# Patient Record
Sex: MALE | Race: WHITE | NOT HISPANIC OR LATINO | Employment: FULL TIME | URBAN - METROPOLITAN AREA
[De-identification: names, ages, dates, MRNs, and addresses within clinical notes are randomized per-mention and may not be internally consistent; named-entity substitution may affect disease eponyms.]

---

## 2018-06-18 ENCOUNTER — OFFICE VISIT - HEALTHEAST (OUTPATIENT)
Dept: FAMILY MEDICINE | Facility: CLINIC | Age: 53
End: 2018-06-18

## 2018-06-18 DIAGNOSIS — Z12.11 SCREEN FOR COLON CANCER: ICD-10-CM

## 2018-06-18 DIAGNOSIS — Z71.1 CONCERN ABOUT HERNIA WITHOUT DIAGNOSIS: ICD-10-CM

## 2018-06-20 ENCOUNTER — COMMUNICATION - HEALTHEAST (OUTPATIENT)
Dept: FAMILY MEDICINE | Facility: CLINIC | Age: 53
End: 2018-06-20

## 2018-06-20 ENCOUNTER — COMMUNICATION - HEALTHEAST (OUTPATIENT)
Dept: SURGERY | Facility: CLINIC | Age: 53
End: 2018-06-20

## 2018-07-06 ENCOUNTER — OFFICE VISIT - HEALTHEAST (OUTPATIENT)
Dept: SURGERY | Facility: CLINIC | Age: 53
End: 2018-07-06

## 2018-07-06 DIAGNOSIS — K40.90 RIGHT INGUINAL HERNIA: ICD-10-CM

## 2018-07-06 ASSESSMENT — MIFFLIN-ST. JEOR: SCORE: 1547.63

## 2018-07-30 ENCOUNTER — OFFICE VISIT - HEALTHEAST (OUTPATIENT)
Dept: FAMILY MEDICINE | Facility: CLINIC | Age: 53
End: 2018-07-30

## 2018-07-30 DIAGNOSIS — K40.90 RIGHT INGUINAL HERNIA: ICD-10-CM

## 2018-07-30 DIAGNOSIS — R94.31 NONSPECIFIC ABNORMAL ELECTROCARDIOGRAM (ECG) (EKG): ICD-10-CM

## 2018-07-30 DIAGNOSIS — Z01.818 PRE-OP EXAM: ICD-10-CM

## 2018-07-30 LAB
ATRIAL RATE - MUSE: 59 BPM
DIASTOLIC BLOOD PRESSURE - MUSE: NORMAL MMHG
INTERPRETATION ECG - MUSE: NORMAL
P AXIS - MUSE: 73 DEGREES
PR INTERVAL - MUSE: 184 MS
QRS DURATION - MUSE: 90 MS
QT - MUSE: 402 MS
QTC - MUSE: 397 MS
R AXIS - MUSE: 10 DEGREES
SYSTOLIC BLOOD PRESSURE - MUSE: NORMAL MMHG
T AXIS - MUSE: 40 DEGREES
VENTRICULAR RATE- MUSE: 59 BPM

## 2018-07-30 ASSESSMENT — MIFFLIN-ST. JEOR: SCORE: 1550.46

## 2018-07-30 NOTE — ASSESSMENT & PLAN NOTE
Abnormal EKG.  No history of heart disease.  Indeed, this patient is quite physically active on a regular basis.  -We will evaluate cardiac function with a stress echocardiogram.  If normal, no contraindication to surgery.  We will attempt to expedite this testing.  -No indication for laboratory tests.

## 2018-08-08 ENCOUNTER — HOSPITAL ENCOUNTER (OUTPATIENT)
Dept: CARDIOLOGY | Facility: HOSPITAL | Age: 53
Discharge: HOME | End: 2018-08-08
Attending: FAMILY MEDICINE

## 2018-08-08 ENCOUNTER — COMMUNICATION - HEALTHEAST (OUTPATIENT)
Dept: FAMILY MEDICINE | Facility: CLINIC | Age: 53
End: 2018-08-08

## 2018-08-08 DIAGNOSIS — Z01.818 PRE-OP EXAM: ICD-10-CM

## 2018-08-08 DIAGNOSIS — R94.31 NONSPECIFIC ABNORMAL ELECTROCARDIOGRAM (ECG) (EKG): ICD-10-CM

## 2018-08-08 LAB
CV STRESS CURRENT BP HE: NORMAL
CV STRESS CURRENT HR HE: 103
CV STRESS CURRENT HR HE: 105
CV STRESS CURRENT HR HE: 105
CV STRESS CURRENT HR HE: 106
CV STRESS CURRENT HR HE: 120
CV STRESS CURRENT HR HE: 125
CV STRESS CURRENT HR HE: 125
CV STRESS CURRENT HR HE: 127
CV STRESS CURRENT HR HE: 143
CV STRESS CURRENT HR HE: 143
CV STRESS CURRENT HR HE: 147
CV STRESS CURRENT HR HE: 156
CV STRESS CURRENT HR HE: 158
CV STRESS CURRENT HR HE: 64
CV STRESS CURRENT HR HE: 66
CV STRESS CURRENT HR HE: 70
CV STRESS CURRENT HR HE: 70
CV STRESS CURRENT HR HE: 71
CV STRESS CURRENT HR HE: 72
CV STRESS CURRENT HR HE: 75
CV STRESS CURRENT HR HE: 75
CV STRESS CURRENT HR HE: 77
CV STRESS CURRENT HR HE: 80
CV STRESS CURRENT HR HE: 81
CV STRESS CURRENT HR HE: 83
CV STRESS CURRENT HR HE: 85
CV STRESS CURRENT HR HE: 85
CV STRESS CURRENT HR HE: 89
CV STRESS CURRENT HR HE: 90
CV STRESS CURRENT HR HE: 92
CV STRESS CURRENT HR HE: 92
CV STRESS CURRENT HR HE: 93
CV STRESS CURRENT HR HE: 93
CV STRESS CURRENT HR HE: 94
CV STRESS CURRENT HR HE: NORMAL
CV STRESS DEVIATION TIME HE: NORMAL
CV STRESS ECHO PERCENT HR HE: NORMAL
CV STRESS EXERCISE STAGE HE: NORMAL
CV STRESS FINAL RESTING BP HE: NORMAL
CV STRESS MAX HR HE: 159
CV STRESS MAX TREADMILL GRADE HE: 18
CV STRESS MAX TREADMILL SPEED HE: 5
CV STRESS PEAK DIA BP HE: NORMAL
CV STRESS PEAK SYS BP HE: NORMAL
CV STRESS PHASE HE: NORMAL
CV STRESS PROTOCOL HE: NORMAL
CV STRESS RESTING PT POSITION HE: NORMAL
CV STRESS ST DEVIATION AMOUNT HE: NORMAL
CV STRESS ST DEVIATION ELEVATION HE: NORMAL
CV STRESS ST EVELATION AMOUNT HE: NORMAL
CV STRESS TEST TYPE HE: NORMAL
CV STRESS TOTAL STAGE TIME MIN 1 HE: NORMAL
ECHO EJECTION FRACTION ESTIMATED: 55 %
STRESS ECHO BASELINE BP: NORMAL
STRESS ECHO BASELINE HR: 69
STRESS ECHO CALCULATED PERCENT HR: 95 %
STRESS ECHO LAST STRESS BP: NORMAL
STRESS ECHO LAST STRESS HR: 158
STRESS ECHO POST ESTIMATED WORKLOAD: 14.9
STRESS ECHO POST EXERCISE DUR MIN: 14
STRESS ECHO POST EXERCISE DUR SEC: 59
STRESS ECHO TARGET HR: 143

## 2018-08-09 ENCOUNTER — ANESTHESIA - HEALTHEAST (OUTPATIENT)
Dept: SURGERY | Facility: HOSPITAL | Age: 53
End: 2018-08-09

## 2018-08-09 ENCOUNTER — COMMUNICATION - HEALTHEAST (OUTPATIENT)
Dept: FAMILY MEDICINE | Facility: CLINIC | Age: 53
End: 2018-08-09

## 2018-08-09 ASSESSMENT — MIFFLIN-ST. JEOR: SCORE: 1550.46

## 2018-08-10 ENCOUNTER — SURGERY - HEALTHEAST (OUTPATIENT)
Dept: SURGERY | Facility: HOSPITAL | Age: 53
End: 2018-08-10

## 2018-08-23 ENCOUNTER — OFFICE VISIT - HEALTHEAST (OUTPATIENT)
Dept: SURGERY | Facility: CLINIC | Age: 53
End: 2018-08-23

## 2018-08-23 DIAGNOSIS — Z98.890 POST-OPERATIVE STATE: ICD-10-CM

## 2018-10-15 ENCOUNTER — RECORDS - HEALTHEAST (OUTPATIENT)
Dept: ADMINISTRATIVE | Facility: OTHER | Age: 53
End: 2018-10-15

## 2021-04-08 ENCOUNTER — AMBULATORY - HEALTHEAST (OUTPATIENT)
Dept: NURSING | Facility: CLINIC | Age: 56
End: 2021-04-08

## 2021-04-29 ENCOUNTER — AMBULATORY - HEALTHEAST (OUTPATIENT)
Dept: NURSING | Facility: CLINIC | Age: 56
End: 2021-04-29

## 2021-06-01 VITALS — BODY MASS INDEX: 24.63 KG/M2 | WEIGHT: 162.5 LBS | HEIGHT: 68 IN

## 2021-06-01 VITALS — HEIGHT: 68 IN | BODY MASS INDEX: 24.86 KG/M2 | WEIGHT: 164 LBS

## 2021-06-01 VITALS — WEIGHT: 167 LBS | BODY MASS INDEX: 25.77 KG/M2

## 2021-06-01 VITALS — HEIGHT: 68 IN | BODY MASS INDEX: 25.19 KG/M2 | WEIGHT: 166.23 LBS

## 2021-06-16 PROBLEM — R94.31 NONSPECIFIC ABNORMAL ELECTROCARDIOGRAM (ECG) (EKG): Status: ACTIVE | Noted: 2018-07-30

## 2021-06-16 PROBLEM — K40.90 RIGHT INGUINAL HERNIA: Status: ACTIVE | Noted: 2018-07-06

## 2021-06-18 NOTE — PROGRESS NOTES
Assessment/Plan:    Rodriguez was seen today for hernia.    Diagnoses and all orders for this visit:    Concern about hernia without diagnosis: Exam is concerning for hernia as well as symptoms.  Referral to general surgery, Dr. Griggs specifically as the surgeon who he has seen previously.  -     Ambulatory referral to General Surgery    Screen for colon cancer  -     Ambulatory referral for Colonoscopy     Return for if not improving, recheck follow-up visit.    Nito Meyer MD  _______________________________    Chief Complaint   Patient presents with     Hernia     right side of groin      Subjective: Rodriguez García is a 52 y.o. year old male who I have seen in clinic before who presents with the following acute complaint(s):    Right groin pain:   - history of left sided hernia   - worse with soccer.  Worse with movement.   - mild budge.  Not consistent.   - no testicular pain   - no other concerns.   - onset: ~1 month    - surgery was completed by Dr. Morgan in early 2016.      ROS: Complete review of systems obtained.  Pertinent items are listed above.     The following portions of the patient's history were reviewed and updated as appropriate: allergies, current medications, past medical history and problem list.     Objective:   /84 (Patient Site: Left Arm, Patient Position: Sitting, Cuff Size: Adult Regular)  Pulse 70  Wt 167 lb (75.8 kg)  BMI 25.77 kg/m2  General: No acute distress  Brain: There is a fullness to palpation in the right suprapubic area.  Contralateral side is relatively flat.  Nontender to palpation.    No results found for this or any previous visit (from the past 24 hour(s)).  No results found.    Additional History from Old Records Summarized (2): no  Decision to Obtain Records (1): no  Radiology Tests Summarized or Ordered (1): no  Labs Reviewed or Ordered (1): no  Medicine Test Summarized or Ordered (1): no  Independent Review of EKG or X-RAY(2 each): no    This  note has been dictated using voice recognition software. Any grammatical or context distortions are unintentional and inherent to the software

## 2021-06-19 NOTE — ANESTHESIA POSTPROCEDURE EVALUATION
Patient: Rodriguez García  REPAIR, HERNIA, INGUINAL, ROBOT-ASSISTED, LAPAROSCOPIC  Anesthesia type: general    Patient location: Phase II Recovery  Last vitals:   Vitals:    08/10/18 1048   BP: 119/74   Pulse:    Resp: 18   Temp:    SpO2:      Post vital signs: stable  Level of consciousness: awake and responds to simple questions  Post-anesthesia pain: pain controlled  Post-anesthesia nausea and vomiting: no  Pulmonary: unassisted, return to baseline  Cardiovascular: stable and blood pressure at baseline  Hydration: adequate  Anesthetic events: no    QCDR Measures:  ASA# 11 - Arely-op Cardiac Arrest: ASA11B - Patient did NOT experience unanticipated cardiac arrest  ASA# 12 - Arely-op Mortality Rate: ASA12B - Patient did NOT die  ASA# 13 - PACU Re-Intubation Rate: ASA13B - Patient did NOT require a new airway mgmt  ASA# 10 - Composite Anes Safety: ASA10A - No serious adverse event    Additional Notes:

## 2021-06-19 NOTE — PROGRESS NOTES
"HPI:  This is a 53 y.o. male here today with concerns of pain and bulging in his right groin area. He has noted this for the past few week(s). The symptoms have progressed and increased over this time. He comes in for evaluation secondary to the hernia causing enough issues to bother them with daily activities or chores.    Allergies:Review of patient's allergies indicates no known allergies.    History reviewed. No pertinent past medical history.    Past Surgical History:   Procedure Laterality Date     INGUINAL HERNIA REPAIR Left 03/04/2016    Dr. Griggs     KNEE ARTHROSCOPY W/ ACL RECONSTRUCTION Right 5/13/2015    Procedure: RIGHT ARTHROSCOPIC ANTERIOR CRUCIATE LIGAMENT RECONSTRUCTION, HAMSTRING AUTOGRAFT, MEDIAL MENISCECTOMY;  Surgeon: Milo Ford MD;  Location: [facility];  Service:      WISDOM TOOTH EXTRACTION         CURRENT MEDS:      Family History   Problem Relation Age of Onset     No Medical Problems Mother      Colon cancer Father      No Medical Problems Sister      No Medical Problems Brother      No Medical Problems Sister      No Medical Problems Brother      No Medical Problems Brother      No Medical Problems Brother      No Medical Problems Brother      No Medical Problems Brother         reports that he has never smoked. He has never used smokeless tobacco. He reports that he drinks about 1.2 oz of alcohol per week  He reports that he does not use illicit drugs.    Review of Systems - Negative except right groin pain. Left lap repair in the past with great results.     Vitals:    07/06/18 1300   BP: 129/84   Patient Site: Right Arm   Patient Position: Sitting   Cuff Size: Adult Regular   Pulse: 64   SpO2: 98%   Weight: 162 lb 8 oz (73.7 kg)   Height: 5' 7.75\" (1.721 m)       Body mass index is 24.89 kg/(m^2).    EXAM:  General: NAD   HEENT: normocephalic, PERRLA and EOMS intact  Mounth: Mucus membranes moist  Neck: Supple  Chest: Clear to auscultation bilaterally  CV: RRR  ABD: " Soft nontender and nondistended, right inguinal hernia, reducible  EXT: Warm, pulses intact,   Neuro: Alert and oriented x3  Back: no CVA tenderness      Assessment/Plan: Pt with a right inguinal hernia. I discussed this at length with He.  I went over conservative management as well as surgical treatment of this.. I would plan on doing this via anrobotic  approach with possible use of mesh. I went over the small risks of surgery including but not limited to bleeding and infection, anesthesia, recurrence rates and nerve injury. I discussed the expected recovery time as well. Will get this scheduled. He will contact us to have this scheduled.      Oswaldo Griggs MD  [facility] Department of Surgery

## 2021-06-19 NOTE — ANESTHESIA PREPROCEDURE EVALUATION
Anesthesia Evaluation      Patient summary reviewed   History of anesthetic complications     Airway   Mallampati: II  Neck ROM: full   Pulmonary - negative ROS and normal exam    breath sounds clear to auscultation  (-) sleep apnea, not a smoker                         Cardiovascular - negative ROS  Exercise tolerance: > or = 10 METS  (-) angina, murmur  Rhythm: regular  Rate: normal,    no murmur      Neuro/Psych - negative ROS     Endo/Other - negative ROS      GI/Hepatic/Renal - negative ROS           Dental - normal exam                        Anesthesia Plan  Planned anesthetic: general endotracheal and total IV anesthesia  zofran  10 mg decadron  scopolamine  ASA 1   Induction: intravenous   Anesthetic plan and risks discussed with: patient and spouse  Anesthesia plan special considerations: antiemetics,   Post-op plan: routine recovery

## 2021-06-19 NOTE — ANESTHESIA CARE TRANSFER NOTE
Last vitals:   Vitals:    08/10/18 0940   BP: 134/71   Pulse: 66   Resp: 20   Temp: 36  C (96.8  F)   SpO2: 97%     Pt brought to PACU on 10L facemask. Monitors applied. VSS upon arrival.    Patient's level of consciousness is drowsy  Spontaneous respirations: yes  Maintains airway independently: yes  Dentition unchanged: yes  Oropharynx: oropharynx clear of all foreign objects    QCDR Measures:  ASA# 20 - Surgical Safety Checklist: WHO surgical safety checklist completed prior to induction  PQRS# 430 - Adult PONV Prevention: 4558F - Pt received => 2 anti-emetic agents (different classes) preop & intraop  ASA# 8 - Peds PONV Prevention: NA - Not pediatric patient, not GA or 2 or more risk factors NOT present  PQRS# 424 - Arely-op Temp Management: 4559F - At least one body temp DOCUMENTED => 35.5C or 95.9F within required timeframe  PQRS# 426 - PACU Transfer Protocol: - Transfer of care checklist used  ASA# 14 - Acute Post-op Pain: ASA14B - Patient did NOT experience pain >= 7 out of 10

## 2021-06-19 NOTE — PROGRESS NOTES
I reviewed the patient's echocardiogram stress test performed on July 30 and the results came back negative for inducible ischemia.  The patient is therefore approved for surgical hernia repair tomorrow.    Dr. Howard (covering for Dr. Michaud)

## 2021-06-19 NOTE — PROGRESS NOTES
Preoperative Exam    Scheduled Procedure: hernia repair   Surgery Date: 08/10/2018  Surgery Location: [facility], fax [phone]    Surgeon:  Dr. Griggs     Assessment/Plan:     Problem List Items Addressed This Visit     Right inguinal hernia     Abnormal EKG.  No history of heart disease.  Indeed, this patient is quite physically active on a regular basis.  -We will evaluate cardiac function with a stress echocardiogram.  If normal, no contraindication to surgery.  We will attempt to expedite this testing.  -No indication for laboratory tests.         Nonspecific abnormal electrocardiogram (ECG) (EKG)    Relevant Orders    Echo Stress Exercise      Other Visit Diagnoses     Pre-op exam    -  Primary    Relevant Orders    Electrocardiogram Perform and Read (Completed)    Echo Stress Exercise        Surgical Procedure Risk: Low (reported cardiac risk generally < 1%)  Have you had prior anesthesia?: Yes  Have you or any family members had a previous anesthesia reaction:  Pt states he had dizziness after knee surgery   Do you or any family members have a history of a clotting or bleeding disorder?: No  Cardiac Risk Assessment:     Patient is not approved for surgery Pending additional cardiovascular evaluation.  Stress echocardiogram ordered.  Anticipate normal study.  We will try to get this test completed in expedited fashion in order for him to have the surgery on 8/10.     - history of post-operative nausea.  Consider intraoperative anti-emetics.     Functional Status: Independent  Patient plans to recover at home with family.     Subjective:      Rodriguez García is a 53 y.o. male who presents for a preoperative consultation.      All other systems reviewed and are negative, other than those listed in the HPI.    Pertinent History  Do you have difficulty breathing or chest pain after walking up a flight of stairs: No  History of obstructive sleep apnea: No  Steroid use in the last 6 months:  "No  Frequent Aspirin/NSAID use: No  Prior Blood Transfusion: No  Prior Blood Transfusion Reaction: No  If for some reason prior to, during or after the procedure, if it is medically indicated, would you be willing to have a blood transfusion?:  There is no transfusion refusal.    No current outpatient prescriptions on file.     No current facility-administered medications for this visit.         No Known Allergies    Patient Active Problem List   Diagnosis     Right inguinal hernia     Nonspecific abnormal electrocardiogram (ECG) (EKG)       History reviewed. No pertinent past medical history.    Past Surgical History:   Procedure Laterality Date     INGUINAL HERNIA REPAIR Left 03/04/2016    Dr. Griggs     KNEE ARTHROSCOPY W/ ACL RECONSTRUCTION Right 5/13/2015    Procedure: RIGHT ARTHROSCOPIC ANTERIOR CRUCIATE LIGAMENT RECONSTRUCTION, HAMSTRING AUTOGRAFT, MEDIAL MENISCECTOMY;  Surgeon: Milo Ford MD;  Location: [facility];  Service:      WISDOM TOOTH EXTRACTION         Social History     Social History     Marital status:      Spouse name: N/A     Number of children: N/A     Years of education: N/A     Occupational History     Not on file.     Social History Main Topics     Smoking status: Never Smoker     Smokeless tobacco: Never Used     Alcohol use 1.2 oz/week     2 Cans of beer per week     Drug use: No     Sexual activity: Not on file     Other Topics Concern     Not on file     Social History Narrative     Patient Care Team:  Nito Meyer MD as PCP - General (Family Medicine)    Objective:     Vitals:    07/30/18 1547   BP: 120/64   Pulse: 60   Resp: 18   Temp: 98.1  F (36.7  C)   TempSrc: Oral   SpO2: 98%   Weight: 164 lb (74.4 kg)   Height: 5' 7.5\" (1.715 m)     Physical Exam:  Physical Exam   Constitutional: He is oriented to person, place, and time. He appears well-developed. No distress.   HENT:   Head: Normocephalic and atraumatic.   Right Ear: External ear normal.   Left " Ear: External ear normal.   Nose: Nose normal.   Mouth/Throat: Oropharynx is clear and moist. No oropharyngeal exudate.   Eyes: Conjunctivae and EOM are normal. Pupils are equal, round, and reactive to light. Right eye exhibits no discharge. Left eye exhibits no discharge. No scleral icterus.   Neck: Normal range of motion. No thyromegaly present.   Cardiovascular: Normal rate, regular rhythm, normal heart sounds and intact distal pulses.  Exam reveals no gallop and no friction rub.    No murmur heard.  Pulmonary/Chest: Effort normal and breath sounds normal. No respiratory distress. He has no wheezes.   Abdominal: Soft. He exhibits no distension and no mass. There is no tenderness.   Musculoskeletal: Normal range of motion. He exhibits no edema.   Lymphadenopathy:     He has no cervical adenopathy.   Neurological: He is alert and oriented to person, place, and time. He has normal reflexes. No cranial nerve deficit. He exhibits normal muscle tone.   Skin: Skin is warm.   Psychiatric: He has a normal mood and affect. His behavior is normal. Judgment and thought content normal.   Vitals reviewed.      There are no Patient Instructions on file for this visit.    EKG: Q waves in the inferior distribution.  Possible age-indeterminate inferior infarct.  No comparisons are available.  My interpretation.    Labs:  Recent Results (from the past 48 hour(s))   Electrocardiogram Perform and Read    Collection Time: 07/30/18  3:58 PM   Result Value Ref Range    SYSTOLIC BLOOD PRESSURE  mmHg    DIASTOLIC BLOOD PRESSURE  mmHg    VENTRICULAR RATE 59 BPM    ATRIAL RATE 59 BPM    P-R INTERVAL 184 ms    QRS DURATION 90 ms    Q-T INTERVAL 402 ms    QTC CALCULATION (BEZET) 397 ms    P Axis 73 degrees    R AXIS 10 degrees    T AXIS 40 degrees    MUSE DIAGNOSIS       Sinus bradycardia  Possible Left atrial enlargement  Cannot rule out Inferior infarct , age undetermined  Abnormal ECG  No previous ECGs available  Confirmed by KEELY ROMAN,  TAL LOC:SJ ([id number]) on 7/30/2018 4:11:10 PM          There is no immunization history on file for this patient.        Electronically signed by Nito Meyer MD 07/31/18 3:50 PM

## 2021-06-20 NOTE — PROGRESS NOTES
HPI: Pt is here for follow up of a robotic inguinal hernia repair.   he is doing well.  Pain is well controlled.  No difficulties with the surgical wound/wounds.  he is eating well and denies fever and chills.         /78 (Patient Site: Right Arm, Patient Position: Sitting, Cuff Size: Adult Regular)  Pulse 60  SpO2 98%    EXAM:  GENERAL:Appears well  ABDOMEN:  Soft, +BS  SURGICAL WOUNDS:  Incisions healing well, no enduration or drainage.      Assessment/Plan: . Doing well after surgery and should follow up as needed.      Milo Schwartz, [facility], [facility] Department of Surgery

## 2021-06-26 ENCOUNTER — HEALTH MAINTENANCE LETTER (OUTPATIENT)
Age: 56
End: 2021-06-26

## 2021-10-16 ENCOUNTER — HEALTH MAINTENANCE LETTER (OUTPATIENT)
Age: 56
End: 2021-10-16

## 2022-07-23 ENCOUNTER — HEALTH MAINTENANCE LETTER (OUTPATIENT)
Age: 57
End: 2022-07-23

## 2022-10-01 ENCOUNTER — HEALTH MAINTENANCE LETTER (OUTPATIENT)
Age: 57
End: 2022-10-01

## 2023-08-01 ENCOUNTER — OFFICE VISIT (OUTPATIENT)
Dept: FAMILY MEDICINE | Facility: CLINIC | Age: 58
End: 2023-08-01
Payer: COMMERCIAL

## 2023-08-01 VITALS
TEMPERATURE: 97.9 F | BODY MASS INDEX: 25.58 KG/M2 | OXYGEN SATURATION: 99 % | HEART RATE: 71 BPM | SYSTOLIC BLOOD PRESSURE: 127 MMHG | RESPIRATION RATE: 16 BRPM | HEIGHT: 67 IN | DIASTOLIC BLOOD PRESSURE: 81 MMHG | WEIGHT: 163 LBS

## 2023-08-01 DIAGNOSIS — Z12.11 SPECIAL SCREENING FOR MALIGNANT NEOPLASMS, COLON: ICD-10-CM

## 2023-08-01 DIAGNOSIS — M25.50 ARTHRALGIA, UNSPECIFIED JOINT: Primary | ICD-10-CM

## 2023-08-01 DIAGNOSIS — Z80.0 FAMILY HISTORY OF COLON CANCER: ICD-10-CM

## 2023-08-01 LAB
CRP SERPL-MCNC: <3 MG/L
ERYTHROCYTE [SEDIMENTATION RATE] IN BLOOD BY WESTERGREN METHOD: 4 MM/HR (ref 0–20)

## 2023-08-01 PROCEDURE — 90471 IMMUNIZATION ADMIN: CPT | Performed by: FAMILY MEDICINE

## 2023-08-01 PROCEDURE — 85652 RBC SED RATE AUTOMATED: CPT | Performed by: FAMILY MEDICINE

## 2023-08-01 PROCEDURE — 86618 LYME DISEASE ANTIBODY: CPT | Performed by: FAMILY MEDICINE

## 2023-08-01 PROCEDURE — 90715 TDAP VACCINE 7 YRS/> IM: CPT | Performed by: FAMILY MEDICINE

## 2023-08-01 PROCEDURE — 99213 OFFICE O/P EST LOW 20 MIN: CPT | Mod: 25 | Performed by: FAMILY MEDICINE

## 2023-08-01 PROCEDURE — 86140 C-REACTIVE PROTEIN: CPT | Performed by: FAMILY MEDICINE

## 2023-08-01 PROCEDURE — 36415 COLL VENOUS BLD VENIPUNCTURE: CPT | Performed by: FAMILY MEDICINE

## 2023-08-01 ASSESSMENT — ENCOUNTER SYMPTOMS: CONSTITUTIONAL NEGATIVE: 1

## 2023-08-01 NOTE — ASSESSMENT & PLAN NOTE
Body pains, localizing to his upper back and neck.  Periodic.  Patient lives in an area endemic for Lyme disease and wonders if this might be systemic Lyme.  We will check Lyme titers and markers of inflammation.  If positive, plan to treat with 21 days of doxycycline.

## 2023-08-01 NOTE — PROGRESS NOTES
"  Assessment & Plan   Problem List Items Addressed This Visit       Arthralgia, unspecified joint - Primary     Body pains, localizing to his upper back and neck.  Periodic.  Patient [place] in an area endemic for Lyme disease and wonders if this might be systemic Lyme.  We will check Lyme titers and markers of inflammation.  If positive, plan to treat with 21 days of doxycycline.         Relevant Orders    Lyme Disease Total Abs Bld with Reflex to Confirm CLIA    CRP, inflammation    ESR: Erythrocyte sedimentation rate     Other Visit Diagnoses       Special screening for malignant neoplasms, colon        Relevant Orders    Colonoscopy Screening  Referral    Family history of colon cancer        Relevant Orders    Colonoscopy Screening  Referral           Nito Meyer MD  [facility]    Radha Frias is a 58 year old, presenting for the following health issues:  Generalized Body Aches (Sore and sleepy. Sx started in May. Neck stiffness.)        8/1/2023     7:20 AM   Additional Questions   Roomed by sac   Accompanied by self         8/1/2023     7:20 AM   Patient Reported Additional Medications   Patient reports taking the following new medications no       Fatigue:   - stiff neck   - late may symptoms of feeling poor energy.  Comes and goes.  \"Every few weeks.\"     - continues to exercise.  Achy in a way that is unexpected.      History of Present Illness       Reason for visit:  Lymes Syptoms  Symptom onset:  More than a month    He eats 4 or more servings of fruits and vegetables daily.He consumes 0 sweetened beverage(s) daily.He exercises with enough effort to increase his heart rate 30 to 60 minutes per day.  He exercises with enough effort to increase his heart rate 6 days per week.   He is taking medications regularly.    Review of Systems   Constitutional: Negative.    All other systems reviewed and are negative.         Objective    /81 (BP " "Location: Left arm, Patient Position: Sitting, Cuff Size: Adult Large)   Pulse 71   Temp 97.9  F (36.6  C) (Oral)   Resp 16   Ht 1.702 m (5' 7\")   Wt 73.9 kg (163 lb)   SpO2 99%   BMI 25.53 kg/m    Body mass index is 25.53 kg/m .  Physical Exam  Nursing note reviewed.   Constitutional:       General: He is not in acute distress.     Appearance: Normal appearance. He is not ill-appearing.   HENT:      Head: Normocephalic and atraumatic.      Right Ear: External ear normal.      Left Ear: External ear normal.   Eyes:      General: No scleral icterus.     Extraocular Movements: Extraocular movements intact.      Conjunctiva/sclera: Conjunctivae normal.   Cardiovascular:      Rate and Rhythm: Normal rate and regular rhythm.      Heart sounds: Normal heart sounds. No murmur heard.     No friction rub. No gallop.   Pulmonary:      Effort: Pulmonary effort is normal. No respiratory distress.      Breath sounds: Normal breath sounds. No wheezing or rales.   Musculoskeletal:         General: No swelling. Normal range of motion.   Skin:     General: Skin is warm.      Coloration: Skin is not jaundiced.      Findings: No rash.   Neurological:      General: No focal deficit present.      Mental Status: He is alert and oriented to person, place, and time. Mental status is at baseline.   Psychiatric:         Attention and Perception: Attention normal.         Mood and Affect: Mood normal.         Speech: Speech normal.         Thought Content: Thought content normal.                          "

## 2023-08-02 LAB — B BURGDOR IGG+IGM SER QL: 0.09

## 2023-08-06 ENCOUNTER — HEALTH MAINTENANCE LETTER (OUTPATIENT)
Age: 58
End: 2023-08-06

## 2023-09-03 ENCOUNTER — HOSPITAL ENCOUNTER (EMERGENCY)
Facility: CLINIC | Age: 58
Discharge: HOME | End: 2023-09-03
Attending: PHYSICIAN ASSISTANT | Admitting: PHYSICIAN ASSISTANT
Payer: COMMERCIAL

## 2023-09-03 VITALS
DIASTOLIC BLOOD PRESSURE: 80 MMHG | OXYGEN SATURATION: 97 % | RESPIRATION RATE: 16 BRPM | TEMPERATURE: 97.8 F | HEART RATE: 82 BPM | SYSTOLIC BLOOD PRESSURE: 125 MMHG

## 2023-09-03 DIAGNOSIS — S91.311A LACERATION OF RIGHT FOOT, INITIAL ENCOUNTER: ICD-10-CM

## 2023-09-03 PROCEDURE — 12002 RPR S/N/AX/GEN/TRNK2.6-7.5CM: CPT

## 2023-09-03 PROCEDURE — 99213 OFFICE O/P EST LOW 20 MIN: CPT | Mod: 25 | Performed by: PHYSICIAN ASSISTANT

## 2023-09-03 PROCEDURE — 12002 RPR S/N/AX/GEN/TRNK2.6-7.5CM: CPT | Performed by: PHYSICIAN ASSISTANT

## 2023-09-03 PROCEDURE — G0463 HOSPITAL OUTPT CLINIC VISIT: HCPCS

## 2023-09-03 ASSESSMENT — ENCOUNTER SYMPTOMS
NEUROLOGICAL NEGATIVE: 1
WOUND: 1
CONSTITUTIONAL NEGATIVE: 1

## 2023-09-03 ASSESSMENT — ACTIVITIES OF DAILY LIVING (ADL): ADLS_ACUITY_SCORE: 35

## 2023-09-03 NOTE — ED PROVIDER NOTES
History     Chief Complaint   Patient presents with    Laceration     Right bottom of foot and one on the big toe. [place]      HPI  Rodriguez García is a 58 year old male who is for laceration on the bottom of the right foot, beneath the MCP joint as well as the bottom of the right great toe which occurred earlier this afternoon.  Stepped on glass bottle [place], causing the lacerations.  Bleeding is controlled direct pressure.  The patient is not on blood thinners.  Did not clean the wound prior to arrival.  No medications taken for relief of pain prior to arrival.  Last Tdap vaccine was in August 2023.  No deficits in strength or sensation in the right foot or right lower extremity.    Allergies:  No Known Allergies    Problem List:    Patient Active Problem List    Diagnosis Date Noted    Arthralgia, unspecified joint 08/01/2023     Priority: Medium    Nonspecific abnormal electrocardiogram (ECG) (EKG) 07/30/2018     Priority: Medium    Right inguinal hernia 07/06/2018     Priority: Medium     Added automatically from request for surgery [id number]            Past Medical History:    No past medical history on file.    Past Surgical History:    Past Surgical History:   Procedure Laterality Date    ARTHROSCOPIC RECONSTRUCTION ANTERIOR CRUCIATE LIGAMENT Right 5/13/2015    Procedure: RIGHT ARTHROSCOPIC ANTERIOR CRUCIATE LIGAMENT RECONSTRUCTION, HAMSTRING AUTOGRAFT, MEDIAL MENISCECTOMY;  Surgeon: Milo Ford MD;  Location: [facility];  Service:     INGUINAL HERNIA REPAIR Left 03/04/2016    Dr. Griggs    RI LAP,INGUINAL HERNIA REPR,INITIAL Right 8/10/2018    Procedure: REPAIR, HERNIA, INGUINAL, ROBOT-ASSISTED, LAPAROSCOPIC;  Surgeon: Oswaldo Griggs MD;  Location: [facility] OR;  Service: General    WISDOM TOOTH EXTRACTION         Family History:    Family History   Problem Relation Age of Onset    No Known Problems Mother     Colon  Cancer Father     No Known Problems Sister     No Known Problems Brother     No Known Problems Sister     No Known Problems Brother     No Known Problems Brother     No Known Problems Brother     No Known Problems Brother     No Known Problems Brother     Prostate Cancer No family hx of        Social History:  Marital Status:   [2]  Social History     Tobacco Use    Smoking status: Never     Passive exposure: Never    Smokeless tobacco: Never   Vaping Use    Vaping Use: Never used   Substance Use Topics    Alcohol use: Yes     Alcohol/week: 2.0 standard drinks of alcohol    Drug use: No        Medications:    No current outpatient medications on file.        Review of Systems   Constitutional: Negative.    Skin:  Positive for wound.   Neurological: Negative.        Physical Exam   BP: 125/80  Pulse: 82  Temp: 97.8  F (36.6  C)  Resp: 16  SpO2: 97 %      Physical Exam  Constitutional:       General: He is not in acute distress.     Appearance: Normal appearance. He is not ill-appearing, toxic-appearing or diaphoretic.   Cardiovascular:      Pulses: Normal pulses.           Dorsalis pedis pulses are 2+ on the right side.        Posterior tibial pulses are 2+ on the right side.   Skin:     General: Skin is warm and dry.      Findings: Laceration present. No rash.      Comments: Two lacerations on the plantar surface of the right foot.  Laceration is 3 cm in length, on the plantar surface of the first MTP joint, 2 mm in depth.  The second laceration is on the plantar surface of the right great toe, oblique to the surface of the skin, approximately 3 cm in length and 1 mm in depth.  Normal strength and sensation in the right foot and right foot toes.  No foreign bodies present on exam.   Neurological:      Mental Status: He is alert and oriented to person, place, and time.      Sensory: No sensory deficit.         ED [facility]    -Laceration Repair    Date/Time:  9/6/2023 11:06 PM    Performed by: Adin Albright PA-C  Authorized by: Adin Albright PA-C    Risks, benefits and alternatives discussed.      ANESTHESIA (see MAR for exact dosages):     Anesthesia method:  Local infiltration    Local anesthetic:  Lidocaine 1% w/o epi  LACERATION DETAILS     Location:  Foot    Foot location:  Sole of R foot    Length (cm):  3    Depth (mm):  2    REPAIR TYPE:     Repair type:  Simple    EXPLORATION:     Hemostasis achieved with:  Direct pressure    Wound exploration: wound explored through full range of motion and entire depth of wound probed and visualized      Wound extent: areolar tissue not violated, fascia not violated, no foreign body, no signs of injury, no nerve damage, no tendon damage, no underlying fracture and no vascular damage      Contaminated: no      TREATMENT:     Area cleansed with:  Betadine    Amount of cleaning:  Standard    Irrigation solution:  Sterile saline    Irrigation volume:  100    Irrigation method:  Pressure wash and syringe    Visualized foreign bodies/material removed: no      SKIN REPAIR     Repair method:  Sutures    Suture size:  4-0    Suture material:  Nylon    Suture technique:  Simple interrupted    Number of sutures:  4    APPROXIMATION     Approximation:  Close    POST-PROCEDURE DETAILS     Dressing:  Antibiotic ointment and non-adherent dressing      PROCEDURE    Patient Tolerance:  Patient tolerated the procedure well with no immediate complications  [facility]    -Laceration Repair    Date/Time: 9/6/2023 11:07 PM    Performed by: Adin Albright PA-C  Authorized by: Adin Albright PA-C    Risks, benefits and alternatives discussed.      ANESTHESIA (see MAR for exact dosages):     Anesthesia method:  Local infiltration    Local anesthetic:  Lidocaine 1% w/o epi  LACERATION DETAILS     Location:  Toe    Toe location:  R big toe    Length (cm):  3    Depth (mm):  1    REPAIR TYPE:      Repair type:  Simple    EXPLORATION:     Hemostasis achieved with:  Direct pressure    Wound exploration: wound explored through full range of motion and entire depth of wound probed and visualized      Wound extent: areolar tissue not violated, fascia not violated, no foreign body, no signs of injury, no nerve damage, no tendon damage, no underlying fracture and no vascular damage      Contaminated: no      TREATMENT:     Area cleansed with:  Betadine    Amount of cleaning:  Standard    Irrigation solution:  Sterile saline    Irrigation volume:  100    Irrigation method:  Pressure wash and syringe    Visualized foreign bodies/material removed: no      SKIN REPAIR     Repair method:  Sutures    Suture size:  4-0    Suture material:  Nylon    Suture technique:  Simple interrupted    Number of sutures:  3    APPROXIMATION     Approximation:  Close    POST-PROCEDURE DETAILS     Dressing:  Antibiotic ointment and non-adherent dressing      PROCEDURE    Patient Tolerance:  Patient tolerated the procedure well with no immediate complications              No results found for this or any previous visit (from the past 24 hour(s)).    Medications - No data to display    Assessments & Plan (with Medical Decision Making)     The patient is a 58-year-old male who presents for evaluation of lacerations on the plantar surface of the right foot as described above.  No neuromuscular deficits on exam.  Bleeding was controlled with direct pressure and with sutures.  No foreign bodies present on exam.    Laceration repair was performed as above.  Apply ice for 15-minute intervals over the next 48 hours and use over-the-counter Tylenol/ibuprofen for pain control as needed.  Recommended keeping the wound covered and dry for the next 24 hours.  Then allow exposure to air at nighttime, change dressings and apply bacitracin twice daily for the next 7 days. Recommend keeping the wound covered while while there is potential for  contamination.  Recommend returning to clinic in 7 to 10 days for suture removal and wound recheck.    Recommend urgent medical evaluation if you develop fevers, worsening pain, pain out of proportion to injury, numbness or tingling or loss of feeling, worsening redness/tenderness/swelling right foot or red streaks progressing up into the right lower extremity.    I have reviewed the nursing notes.    I have reviewed the findings, diagnosis, plan and need for follow up with the patient.      There are no discharge medications for this patient.      Final diagnoses:   Laceration of right foot, initial encounter       9/3/2023   [facility] EMERGENCY DEPT       Adin Albright PA-C  09/06/23 [id number]

## 2023-09-03 NOTE — DISCHARGE INSTRUCTIONS
pply ice for 15-minute intervals over the next 48 hours and use over-the-counter Tylenol/ibuprofen for pain control as needed.  Recommended keeping the wound covered and dry for the next 24 hours.  Then allow exposure to air at nighttime, change dressings and apply bacitracin twice daily for the next 7 days. Recommend keeping the wound covered while while there is potential for contamination.  Recommend returning to clinic in 7 to 10 days for suture removal and wound recheck.    Recommend urgent medical evaluation if you develop fevers, worsening pain, pain out of proportion to injury, numbness or tingling or loss of feeling, worsening redness/tenderness/swelling right foot or red streaks progressing up into the right lower extremity.

## 2023-09-05 ENCOUNTER — TELEPHONE (OUTPATIENT)
Dept: FAMILY MEDICINE | Facility: CLINIC | Age: 58
End: 2023-09-05
Payer: COMMERCIAL

## 2023-09-05 NOTE — TELEPHONE ENCOUNTER
----- Message from Nito Myeer MD sent at 9/5/2023  5:54 AM CDT -----  Regarding: Needs follow-up for suture removal  Please call and help schedule with any provider for suture removal.  Per ED notes 9/10 is the approximate date for removal of sutures.

## 2023-09-06 NOTE — TELEPHONE ENCOUNTER
Left message to call back for: Patient  Information to relay to patient: Please help patient schedule RN for suture removal, 9/11/2023.

## 2023-09-15 ENCOUNTER — ALLIED HEALTH/NURSE VISIT (OUTPATIENT)
Dept: FAMILY MEDICINE | Facility: CLINIC | Age: 58
End: 2023-09-15
Payer: COMMERCIAL

## 2023-09-15 DIAGNOSIS — S91.319A LACERATION OF FOOT: Primary | ICD-10-CM

## 2023-09-15 PROCEDURE — 99207 PR NO CHARGE NURSE ONLY: CPT

## 2023-09-15 NOTE — PROGRESS NOTES
Rodriguez SHIRLEY García presents to the clinic today for removal of sutures and suture.  The patient has had the sutures in place for 11 days.  There has been no history of infection or drainage.  7 sutures are seen located on the ball of foot and the big toe on right side.  The wound is healing well with no signs of infection.  Tetanus status is up to date.   All sutures were easily removed today.  Routine wound care discussed.  The patient will follow up as needed.

## 2024-09-28 ENCOUNTER — HEALTH MAINTENANCE LETTER (OUTPATIENT)
Age: 59
End: 2024-09-28